# Patient Record
(demographics unavailable — no encounter records)

---

## 2025-01-24 NOTE — HISTORY OF PRESENT ILLNESS
[de-identified] : 3yr old m c/o sore throat right ear ache not eating fever [FreeTextEntry6] : sore throat, right ear pain, fever x 1 day. Drinking ok. Tied. Normal elimination.

## 2025-01-24 NOTE — PHYSICAL EXAM
[Inflamed Nasal Mucosa] : inflamed nasal mucosa [Erythematous Oropharynx] : erythematous oropharynx [Enlarged Tonsils] : enlarged tonsils [NL] : warm, clear [de-identified] : b/l cervical lymphadenopathy; FROM

## 2025-01-24 NOTE — DISCUSSION/SUMMARY
[FreeTextEntry1] : 3y M seen for acute visit. Ear exam normal. Bright red pharyngitis. Rapid strep neg. If TC positive, Amox BID x 10 days. Non-toxic, well hydrated, handling well at this time. Supportive care. RTO PRN persistent, worsening, or new concerning symptoms.

## 2025-01-24 NOTE — HISTORY OF PRESENT ILLNESS
[de-identified] : 3yr old m c/o sore throat right ear ache not eating fever [FreeTextEntry6] : sore throat, right ear pain, fever x 1 day. Drinking ok. Tied. Normal elimination.

## 2025-02-28 NOTE — HISTORY OF PRESENT ILLNESS
[Normal] : Normal [Brushing teeth] : Brushing teeth [Yes] : Patient goes to dentist yearly [Toothpaste] : Primary Fluoride Source: Toothpaste [In nursery school] : In nursery school [Appropiate parent-child communication] : Appropriate parent-child communication [Parent has appropriate responses to behavior] : Parent has appropriate responses to behavior [No] : No cigarette smoke exposure [Car seat in back seat] : Car seat in back seat [Smoke Detectors] : Smoke detectors [Supervised play near cars and streets] : Supervised play near cars and streets [Carbon Monoxide Detectors] : Carbon monoxide detectors [Exposure to electronic nicotine delivery system] : No exposure to electronic nicotine delivery system [FreeTextEntry7] : 3yr old m here for a physical [de-identified] : good eater, variety of food groups, water ad alma delia, no excessive milk nor juice

## 2025-02-28 NOTE — PHYSICAL EXAM
[Alert] : alert [No Acute Distress] : no acute distress [Playful] : playful [Normocephalic] : normocephalic [Conjunctivae with no discharge] : conjunctivae with no discharge [PERRL] : PERRL [EOMI Bilateral] : EOMI bilateral [Auricles Well Formed] : auricles well formed [Clear Tympanic membranes with present light reflex and bony landmarks] : clear tympanic membranes with present light reflex and bony landmarks [No Discharge] : no discharge [Nares Patent] : nares patent [Pink Nasal Mucosa] : pink nasal mucosa [Palate Intact] : palate intact [Uvula Midline] : uvula midline [Nonerythematous Oropharynx] : nonerythematous oropharynx [No Caries] : no caries [Trachea Midline] : trachea midline [Supple, full passive range of motion] : supple, full passive range of motion [No Palpable Masses] : no palpable masses [Symmetric Chest Rise] : symmetric chest rise [Clear to Auscultation Bilaterally] : clear to auscultation bilaterally [Normoactive Precordium] : normoactive precordium [Regular Rate and Rhythm] : regular rate and rhythm [Normal S1, S2 present] : normal S1, S2 present [No Murmurs] : no murmurs [+2 Femoral Pulses] : +2 femoral pulses [Soft] : soft [NonTender] : non tender [Non Distended] : non distended [Normoactive Bowel Sounds] : normoactive bowel sounds [No Hepatomegaly] : no hepatomegaly [No Splenomegaly] : no splenomegaly [Edson 1] : Edson 1 [Circumcised] : circumcised [Central Urethral Opening] : central urethral opening [Testicles Descended Bilaterally] : testicles descended bilaterally [Patent] : patent [Normally Placed] : normally placed [No Abnormal Lymph Nodes Palpated] : no abnormal lymph nodes palpated [Symmetric Buttocks Creases] : symmetric buttocks creases [Symmetric Hip Rotation] : symmetric hip rotation [No Gait Asymmetry] : no gait asymmetry [No pain or deformities with palpation of bone, muscles, joints] : no pain or deformities with palpation of bone, muscles, joints [Normal Muscle Tone] : normal muscle tone [No Spinal Dimple] : no spinal dimple [NoTuft of Hair] : no tuft of hair [Straight] : straight [+2 Patella DTR] : +2 patella DTR [Cranial Nerves Grossly Intact] : cranial nerves grossly intact [No Rash or Lesions] : no rash or lesions [FreeTextEntry9] : no masses [FreeTextEntry6] : redundant foreskin- no irritation, no adhesion

## 2025-02-28 NOTE — DISCUSSION/SUMMARY
[Normal Growth] : growth [Normal Development] : development [None] : No known medical problems [No Elimination Concerns] : elimination [No Feeding Concerns] : feeding [No Skin Concerns] : skin [Normal Sleep Pattern] : sleep [Family Support] : family support [Encouraging Literacy Activities] : encouraging literacy activities [Playing with Peers] : playing with peers [Promoting Physical Activity] : promoting physical activity [Safety] : safety [No Medications] : ~He/She~ is not on any medications [Parent/Guardian] : parent/guardian [FreeTextEntry1] : 3y M seen for WCC. Normal exam. Appropriate interval growth. Vaccines UTD. Counseled re: COVID 19 and influenza vaccinations. No developmental concerns at this time.

## 2025-03-21 NOTE — HISTORY OF PRESENT ILLNESS
[de-identified] : intermittent fever, emesis episode last night, and non productive cough as per dad  [FreeTextEntry6] : 2 days of fever, fatigue,  2 episodes of vomiting, sore throat and ear pain since this morning. Tolerating PO. Sibling with recent flu.

## 2025-03-21 NOTE — DISCUSSION/SUMMARY
[FreeTextEntry1] : Flu A pos. POC strep negative If culture pos treat with Amoxicillin 250mg/5ml, 8ml BID x 10 days. Treat fever with ibuprofen or acetaminophen as per 's instructions. Cool compresses for comfort. Increase fluids. Return if fever lasts >5 days or for new/worsening symptoms. Monitor for signs of dehydration such as low urine output or lethargy; seek immediate medical attention for these symptoms or fever >105.